# Patient Record
Sex: MALE | URBAN - METROPOLITAN AREA
[De-identification: names, ages, dates, MRNs, and addresses within clinical notes are randomized per-mention and may not be internally consistent; named-entity substitution may affect disease eponyms.]

---

## 2023-04-25 ENCOUNTER — WEB ENCOUNTER (OUTPATIENT)
Dept: URBAN - METROPOLITAN AREA CLINIC 6 | Facility: CLINIC | Age: 53
End: 2023-04-25

## 2023-04-25 ENCOUNTER — WEB ENCOUNTER (OUTPATIENT)
Dept: URBAN - METROPOLITAN AREA CLINIC 40 | Facility: CLINIC | Age: 53
End: 2023-04-25

## 2023-04-28 ENCOUNTER — CLAIMS CREATED FROM THE CLAIM WINDOW (OUTPATIENT)
Dept: URBAN - METROPOLITAN AREA CLINIC 40 | Facility: CLINIC | Age: 53
End: 2023-04-28
Payer: COMMERCIAL

## 2023-04-28 ENCOUNTER — LAB OUTSIDE AN ENCOUNTER (OUTPATIENT)
Dept: URBAN - METROPOLITAN AREA CLINIC 40 | Facility: CLINIC | Age: 53
End: 2023-04-28

## 2023-04-28 VITALS
SYSTOLIC BLOOD PRESSURE: 130 MMHG | HEIGHT: 70 IN | BODY MASS INDEX: 39.28 KG/M2 | HEART RATE: 80 BPM | DIASTOLIC BLOOD PRESSURE: 80 MMHG | TEMPERATURE: 96.8 F | WEIGHT: 274.4 LBS

## 2023-04-28 DIAGNOSIS — R10.84 GENERALIZED ABDOMINAL CRAMPING: ICD-10-CM

## 2023-04-28 DIAGNOSIS — R14.0 BLOATING: ICD-10-CM

## 2023-04-28 DIAGNOSIS — K58.0 IRRITABLE BOWEL SYNDROME WITH DIARRHEA: ICD-10-CM

## 2023-04-28 DIAGNOSIS — R14.0 ABDOMINAL DISTENSION: ICD-10-CM

## 2023-04-28 PROBLEM — 197125005: Status: ACTIVE | Noted: 2023-04-28

## 2023-04-28 PROBLEM — 60728008: Status: ACTIVE | Noted: 2023-04-28

## 2023-04-28 PROCEDURE — 99204 OFFICE O/P NEW MOD 45 MIN: CPT | Performed by: INTERNAL MEDICINE

## 2023-04-28 RX ORDER — TRAMADOL HYDROCHLORIDE 50 MG/1
TABLET, COATED ORAL
Qty: 21 TABLET | Status: ACTIVE | COMMUNITY

## 2023-04-28 RX ORDER — FOLIC ACID 1 MG/1
TABLET ORAL
Qty: 90 TABLET | Status: ACTIVE | COMMUNITY

## 2023-04-28 RX ORDER — HYDRALAZINE HYDROCHLORIDE 50 MG/1
TABLET, FILM COATED ORAL
Qty: 270 TABLET | Status: ACTIVE | COMMUNITY

## 2023-04-28 RX ORDER — METHOTREXATE 2.5 MG/1
TABLET ORAL
Qty: 78 TABLET | Status: ACTIVE | COMMUNITY

## 2023-04-28 RX ORDER — FLUTICASONE FUROATE AND VILANTEROL TRIFENATATE 200; 25 UG/1; UG/1
POWDER RESPIRATORY (INHALATION)
Qty: 180 EACH | Status: ACTIVE | COMMUNITY

## 2023-04-28 RX ORDER — INSULIN LISPRO 100 [IU]/ML
INJECTION, SOLUTION INTRAVENOUS; SUBCUTANEOUS
Qty: 90 MILLILITER | Status: ACTIVE | COMMUNITY

## 2023-04-28 RX ORDER — ALBUTEROL SULFATE 108 UG/1
INHALANT RESPIRATORY (INHALATION)
Qty: 6.7 GRAM | Status: ACTIVE | COMMUNITY

## 2023-04-28 RX ORDER — PREDNISONE 5 MG/1
TABLET ORAL
Qty: 90 TABLET | Status: ACTIVE | COMMUNITY

## 2023-04-28 RX ORDER — CLONIDINE HYDROCHLORIDE 0.2 MG/1
TABLET ORAL
Qty: 180 TABLET | Status: ACTIVE | COMMUNITY

## 2023-04-28 RX ORDER — ATORVASTATIN CALCIUM 80 MG/1
TABLET, FILM COATED ORAL
Qty: 90 TABLET | Status: ACTIVE | COMMUNITY

## 2023-04-28 RX ORDER — FUROSEMIDE 20 MG/1
TABLET ORAL
Qty: 30 EACH | Refills: 0 | Status: ACTIVE | COMMUNITY

## 2023-04-28 RX ORDER — CARVEDILOL 25 MG/1
TABLET, FILM COATED ORAL
Qty: 180 TABLET | Status: ACTIVE | COMMUNITY

## 2023-04-28 RX ORDER — BRIMONIDINE TARTRATE AND TIMOLOL MALEATE 2; 5 MG/ML; MG/ML
INSTILL 1 DROP TWICE DAILY IN THE LEFT EYE SOLUTION/ DROPS OPHTHALMIC
Qty: 5 MILLILITER | Refills: 1 | Status: ACTIVE | COMMUNITY

## 2023-04-28 RX ORDER — RIFAXIMIN 550 MG/1
1 TABLET TABLET ORAL THREE TIMES A DAY
Qty: 42 TABLET | Refills: 0 | OUTPATIENT
Start: 2023-04-28 | End: 2023-05-12

## 2023-04-28 NOTE — HPI-TODAY'S VISIT:
53-year-old  male here to establish with GI.  He has been noticing progressive abdominal distention for the past year.  He has history of fatty liver disease and is obese.  Had vasculitis and is currently on long-term prednisone for the past 3 to 4 years.  He is on Rituxan and methotrexate as well.  Cardiac and lung work-up have been negative.  No recent abdominal imaging.  Has longstanding history of fatty liver disease.  Recent labs were normal.  He feels short of breath because of distention of his abdomen.  Intermittent loose stools, but overall has normal bowel bowel movements.

## 2023-04-28 NOTE — PHYSICAL EXAM GASTROINTESTINAL
Abdomen , soft, obese, distended , no guarding or rigidity , no masses palpable , normal bowel sounds , Liver and Spleen , no hepatomegaly present , no hepatosplenomegaly , liver nontender , spleen not palpable

## 2023-04-29 ENCOUNTER — WEB ENCOUNTER (OUTPATIENT)
Dept: URBAN - METROPOLITAN AREA CLINIC 40 | Facility: CLINIC | Age: 53
End: 2023-04-29

## 2023-04-29 RX ORDER — METRONIDAZOLE 500 MG/1
1 TABLET TABLET ORAL THREE TIMES A DAY
Qty: 42 TABLET | Refills: 0 | OUTPATIENT
Start: 2023-05-02 | End: 2023-05-16

## 2023-06-28 ENCOUNTER — WEB ENCOUNTER (OUTPATIENT)
Dept: URBAN - METROPOLITAN AREA CLINIC 40 | Facility: CLINIC | Age: 53
End: 2023-06-28

## 2023-06-30 ENCOUNTER — OFFICE VISIT (OUTPATIENT)
Dept: URBAN - METROPOLITAN AREA CLINIC 40 | Facility: CLINIC | Age: 53
End: 2023-06-30
Payer: COMMERCIAL

## 2023-06-30 VITALS
WEIGHT: 262.8 LBS | TEMPERATURE: 97 F | BODY MASS INDEX: 37.62 KG/M2 | DIASTOLIC BLOOD PRESSURE: 70 MMHG | HEART RATE: 87 BPM | SYSTOLIC BLOOD PRESSURE: 130 MMHG | HEIGHT: 70 IN

## 2023-06-30 DIAGNOSIS — R10.84 GENERALIZED ABDOMINAL CRAMPING: ICD-10-CM

## 2023-06-30 DIAGNOSIS — K58.0 IRRITABLE BOWEL SYNDROME WITH DIARRHEA: ICD-10-CM

## 2023-06-30 DIAGNOSIS — R14.0 BLOATING: ICD-10-CM

## 2023-06-30 PROCEDURE — 99214 OFFICE O/P EST MOD 30 MIN: CPT | Performed by: INTERNAL MEDICINE

## 2023-06-30 RX ORDER — CARVEDILOL 25 MG/1
TABLET, FILM COATED ORAL
Qty: 180 TABLET | Status: ACTIVE | COMMUNITY

## 2023-06-30 RX ORDER — INSULIN LISPRO 100 [IU]/ML
INJECTION, SOLUTION INTRAVENOUS; SUBCUTANEOUS
Qty: 90 MILLILITER | Status: ACTIVE | COMMUNITY

## 2023-06-30 RX ORDER — BRIMONIDINE TARTRATE AND TIMOLOL MALEATE 2; 5 MG/ML; MG/ML
INSTILL 1 DROP TWICE DAILY IN THE LEFT EYE SOLUTION/ DROPS OPHTHALMIC
Qty: 5 MILLILITER | Refills: 1 | Status: ACTIVE | COMMUNITY

## 2023-06-30 RX ORDER — METHOTREXATE 2.5 MG/1
TABLET ORAL
Qty: 78 TABLET | Status: ACTIVE | COMMUNITY

## 2023-06-30 RX ORDER — FUROSEMIDE 20 MG/1
TABLET ORAL
Qty: 30 EACH | Refills: 0 | Status: ACTIVE | COMMUNITY

## 2023-06-30 RX ORDER — HYDRALAZINE HYDROCHLORIDE 50 MG/1
TABLET, FILM COATED ORAL
Qty: 270 TABLET | Status: ACTIVE | COMMUNITY

## 2023-06-30 RX ORDER — FLUTICASONE FUROATE AND VILANTEROL TRIFENATATE 200; 25 UG/1; UG/1
POWDER RESPIRATORY (INHALATION)
Qty: 180 EACH | Status: ACTIVE | COMMUNITY

## 2023-06-30 RX ORDER — ALBUTEROL SULFATE 108 UG/1
INHALANT RESPIRATORY (INHALATION)
Qty: 6.7 GRAM | Status: ACTIVE | COMMUNITY

## 2023-06-30 RX ORDER — TRAMADOL HYDROCHLORIDE 50 MG/1
TABLET, COATED ORAL
Qty: 21 TABLET | Status: ACTIVE | COMMUNITY

## 2023-06-30 RX ORDER — PREDNISONE 5 MG/1
TABLET ORAL
Qty: 90 TABLET | Status: ACTIVE | COMMUNITY

## 2023-06-30 RX ORDER — FOLIC ACID 1 MG/1
TABLET ORAL
Qty: 90 TABLET | Status: ACTIVE | COMMUNITY

## 2023-06-30 RX ORDER — ATORVASTATIN CALCIUM 80 MG/1
TABLET, FILM COATED ORAL
Qty: 90 TABLET | Status: ACTIVE | COMMUNITY

## 2023-06-30 RX ORDER — CLONIDINE HYDROCHLORIDE 0.2 MG/1
TABLET ORAL
Qty: 180 TABLET | Status: ACTIVE | COMMUNITY

## 2023-06-30 NOTE — HPI-TODAY'S VISIT:
53-year-old  male here for f/u.  abdominal distension is better after flagyl/low FODMAP diet. He has history of fatty liver disease and is obese.  Had vasculitis and is currently on long-term prednisone for the past 3 to 4 years.  He is on Rituxan and methotrexate as well.  Cardiac and lung work-up have been negative.  recent CT-fatty replacement of pancreas/periumbilical hernia.  Has longstanding history of fatty liver disease.  Recent labs were normal.   Intermittent loose stools, but overall has normal bowel bowel movements.had EGD/colonoscopy in 2015. no polyps at that time

## 2023-06-30 NOTE — PHYSICAL EXAM GASTROINTESTINAL
Abdomen , soft, obese, less bloated than before, reducible umbilical hernia, no guarding or rigidity , no masses palpable , normal bowel sounds , Liver and Spleen , no hepatomegaly present , no hepatosplenomegaly , liver nontender , spleen not palpable

## 2023-10-23 ENCOUNTER — WEB ENCOUNTER (OUTPATIENT)
Dept: URBAN - METROPOLITAN AREA CLINIC 40 | Facility: CLINIC | Age: 53
End: 2023-10-23

## 2023-10-24 ENCOUNTER — OFFICE VISIT (OUTPATIENT)
Dept: URBAN - METROPOLITAN AREA CLINIC 40 | Facility: CLINIC | Age: 53
End: 2023-10-24
Payer: COMMERCIAL

## 2023-10-24 VITALS
TEMPERATURE: 97.9 F | BODY MASS INDEX: 37.51 KG/M2 | SYSTOLIC BLOOD PRESSURE: 146 MMHG | HEART RATE: 65 BPM | DIASTOLIC BLOOD PRESSURE: 68 MMHG | WEIGHT: 262 LBS | HEIGHT: 70 IN

## 2023-10-24 DIAGNOSIS — R14.0 ABDOMINAL DISTENSION: ICD-10-CM

## 2023-10-24 DIAGNOSIS — K52.9 CHRONIC DIARRHEA: ICD-10-CM

## 2023-10-24 DIAGNOSIS — R14.0 BLOATING: ICD-10-CM

## 2023-10-24 DIAGNOSIS — R10.84 GENERALIZED ABDOMINAL CRAMPING: ICD-10-CM

## 2023-10-24 DIAGNOSIS — K58.0 IRRITABLE BOWEL SYNDROME WITH DIARRHEA: ICD-10-CM

## 2023-10-24 PROCEDURE — 99214 OFFICE O/P EST MOD 30 MIN: CPT | Performed by: PHYSICIAN ASSISTANT

## 2023-10-24 RX ORDER — TERAZOSIN 2 MG/1
1 CAPSULE AT BEDTIME CAPSULE ORAL ONCE A DAY
Status: ACTIVE | COMMUNITY

## 2023-10-24 RX ORDER — HYDRALAZINE HYDROCHLORIDE 50 MG/1
TABLET, FILM COATED ORAL
Qty: 270 TABLET | Status: ACTIVE | COMMUNITY

## 2023-10-24 RX ORDER — FOLIC ACID 1 MG/1
TABLET ORAL
Qty: 90 TABLET | Status: ACTIVE | COMMUNITY

## 2023-10-24 RX ORDER — CLONIDINE HYDROCHLORIDE 0.2 MG/1
TABLET ORAL
Qty: 180 TABLET | Status: ACTIVE | COMMUNITY

## 2023-10-24 RX ORDER — CYCLOBENZAPRINE HYDROCHLORIDE 10 MG/1
1 TABLET AT BEDTIME AS NEEDED TABLET, FILM COATED ORAL ONCE A DAY
Status: ACTIVE | COMMUNITY

## 2023-10-24 RX ORDER — FUROSEMIDE 20 MG/1
TABLET ORAL
Qty: 30 EACH | Refills: 0 | Status: ACTIVE | COMMUNITY

## 2023-10-24 RX ORDER — TRAMADOL HYDROCHLORIDE 50 MG/1
TABLET, COATED ORAL
Qty: 21 TABLET | Status: ACTIVE | COMMUNITY

## 2023-10-24 RX ORDER — INSULIN LISPRO 100 [IU]/ML
INJECTION, SOLUTION INTRAVENOUS; SUBCUTANEOUS
Qty: 90 MILLILITER | Status: ACTIVE | COMMUNITY

## 2023-10-24 RX ORDER — PREDNISONE 5 MG/1
TABLET ORAL
Qty: 90 TABLET | Status: ACTIVE | COMMUNITY

## 2023-10-24 RX ORDER — METHOTREXATE 2.5 MG/1
TABLET ORAL
Qty: 78 TABLET | Status: ACTIVE | COMMUNITY

## 2023-10-24 RX ORDER — FLUTICASONE FUROATE AND VILANTEROL TRIFENATATE 200; 25 UG/1; UG/1
POWDER RESPIRATORY (INHALATION)
Qty: 180 EACH | Status: ACTIVE | COMMUNITY

## 2023-10-24 RX ORDER — BRIMONIDINE TARTRATE AND TIMOLOL MALEATE 2; 5 MG/ML; MG/ML
INSTILL 1 DROP TWICE DAILY IN THE LEFT EYE SOLUTION/ DROPS OPHTHALMIC
Qty: 5 MILLILITER | Refills: 1 | Status: ACTIVE | COMMUNITY

## 2023-10-24 RX ORDER — CARVEDILOL 25 MG/1
TABLET, FILM COATED ORAL
Qty: 180 TABLET | Status: ACTIVE | COMMUNITY

## 2023-10-24 RX ORDER — ATORVASTATIN CALCIUM 80 MG/1
TABLET, FILM COATED ORAL
Qty: 90 TABLET | Status: ACTIVE | COMMUNITY

## 2023-10-24 RX ORDER — ALBUTEROL SULFATE 108 UG/1
INHALANT RESPIRATORY (INHALATION)
Qty: 6.7 GRAM | Status: ACTIVE | COMMUNITY

## 2023-10-24 NOTE — HPI-TODAY'S VISIT:
53-year-old  male here for f/u.  abdominal distension is better after flagyl/low FODMAP diet. He has history of fatty liver disease and is obese.  Had vasculitis and is currently on long-term prednisone for the past 3 to 4 years.  He is on Rituxan and methotrexate as well.  Cardiac and lung work-up have been negative.  recent CT-fatty replacement of pancreas/periumbilical hernia.  Has longstanding history of fatty liver disease.  Recent labs were normal.   Intermittent loose stools, but overall has normal bowel bowel movements.had EGD/colonoscopy in 2015. no polyps at that time He returns to the office today once again reporting bloating.  Admits that he is drinking beer several days a week though he is cut back overall.  No nausea, vomiting or dysphagia.  He does believe a trial of Flagyl may have helped him previously.  The diarrhea has improved as he is only really having loose bowel movement once daily without any bleeding.  He did not complete fecal fat or elastase testing from his last visit.  He is maintaining a low-carb diet, eating healthy fats and protein mainly.

## 2023-10-26 LAB
IMMUNOGLOBULIN A: 145
INTERPRETATION: (no result)
TISSUE TRANSGLUTAMINASE AB, IGA: <1

## 2023-11-14 ENCOUNTER — LAB OUTSIDE AN ENCOUNTER (OUTPATIENT)
Dept: URBAN - METROPOLITAN AREA CLINIC 40 | Facility: CLINIC | Age: 53
End: 2023-11-14

## 2023-11-18 LAB
ADENOVIRUS F 40/41: NOT DETECTED
CAMPYLOBACTER: NOT DETECTED
CLOSTRIDIUM DIFFICILE: NOT DETECTED
ENTAMOEBA HISTOLYTICA: NOT DETECTED
ENTEROAGGREGATIVE E.COLI: NOT DETECTED
ENTEROTOXIGENIC E.COLI: NOT DETECTED
ESCHERICHIA COLI O157: NOT DETECTED
FECAL FAT, QUALITATIVE: (no result)
GIARDIA LAMBLIA: NOT DETECTED
NOROVIRUS GI/GII: NOT DETECTED
PANCREATICELASTASE ELISA, STOOL: (no result)
ROTAVIRUS A: NOT DETECTED
SALMONELLA SPP.: NOT DETECTED
SHIGA-LIKE TOXIN PRODUCING E.COLI: NOT DETECTED
SHIGELLA SPP. / ENTEROINVASIVE E.COLI: NOT DETECTED
VIBRIO PARAHAEMOLYTICUS: NOT DETECTED
VIBRIO SPP.: NOT DETECTED
YERSINIA ENTEROCOLITICA: NOT DETECTED

## 2023-11-21 LAB — RESULT:: (no result)

## 2023-12-05 ENCOUNTER — DASHBOARD ENCOUNTERS (OUTPATIENT)
Age: 53
End: 2023-12-05

## 2023-12-05 ENCOUNTER — OFFICE VISIT (OUTPATIENT)
Dept: URBAN - METROPOLITAN AREA CLINIC 40 | Facility: CLINIC | Age: 53
End: 2023-12-05
Payer: COMMERCIAL

## 2023-12-05 VITALS
SYSTOLIC BLOOD PRESSURE: 134 MMHG | WEIGHT: 266 LBS | HEART RATE: 70 BPM | BODY MASS INDEX: 38.08 KG/M2 | DIASTOLIC BLOOD PRESSURE: 76 MMHG | HEIGHT: 70 IN | TEMPERATURE: 97.9 F

## 2023-12-05 DIAGNOSIS — R10.84 GENERALIZED ABDOMINAL CRAMPING: ICD-10-CM

## 2023-12-05 DIAGNOSIS — K58.0 IRRITABLE BOWEL SYNDROME WITH DIARRHEA: ICD-10-CM

## 2023-12-05 DIAGNOSIS — R14.0 BLOATING: ICD-10-CM

## 2023-12-05 PROCEDURE — 99213 OFFICE O/P EST LOW 20 MIN: CPT | Performed by: PHYSICIAN ASSISTANT

## 2023-12-05 RX ORDER — FUROSEMIDE 20 MG/1
TABLET ORAL
Qty: 30 EACH | Refills: 0 | Status: ACTIVE | COMMUNITY

## 2023-12-05 RX ORDER — CYCLOBENZAPRINE HYDROCHLORIDE 10 MG/1
1 TABLET AT BEDTIME AS NEEDED TABLET, FILM COATED ORAL ONCE A DAY
Status: ACTIVE | COMMUNITY

## 2023-12-05 RX ORDER — ATORVASTATIN CALCIUM 80 MG/1
TABLET, FILM COATED ORAL
Qty: 90 TABLET | Status: ACTIVE | COMMUNITY

## 2023-12-05 RX ORDER — CARVEDILOL 25 MG/1
TABLET, FILM COATED ORAL
Qty: 180 TABLET | Status: ACTIVE | COMMUNITY

## 2023-12-05 RX ORDER — BRIMONIDINE TARTRATE AND TIMOLOL MALEATE 2; 5 MG/ML; MG/ML
INSTILL 1 DROP TWICE DAILY IN THE LEFT EYE SOLUTION/ DROPS OPHTHALMIC
Qty: 5 MILLILITER | Refills: 1 | Status: ACTIVE | COMMUNITY

## 2023-12-05 RX ORDER — TERAZOSIN 2 MG/1
1 CAPSULE AT BEDTIME CAPSULE ORAL ONCE A DAY
Status: ACTIVE | COMMUNITY

## 2023-12-05 RX ORDER — CLONIDINE HYDROCHLORIDE 0.2 MG/1
TABLET ORAL
Qty: 180 TABLET | Status: ACTIVE | COMMUNITY

## 2023-12-05 RX ORDER — PREDNISONE 5 MG/1
TABLET ORAL
Qty: 90 TABLET | Status: ACTIVE | COMMUNITY

## 2023-12-05 RX ORDER — FLUTICASONE FUROATE AND VILANTEROL TRIFENATATE 200; 25 UG/1; UG/1
POWDER RESPIRATORY (INHALATION)
Qty: 180 EACH | Status: ACTIVE | COMMUNITY

## 2023-12-05 RX ORDER — FOLIC ACID 1 MG/1
TABLET ORAL
Qty: 90 TABLET | Status: ACTIVE | COMMUNITY

## 2023-12-05 RX ORDER — TRAMADOL HYDROCHLORIDE 50 MG/1
TABLET, COATED ORAL
Qty: 21 TABLET | Status: ACTIVE | COMMUNITY

## 2023-12-05 RX ORDER — INSULIN LISPRO 100 [IU]/ML
INJECTION, SOLUTION INTRAVENOUS; SUBCUTANEOUS
Qty: 90 MILLILITER | Status: ACTIVE | COMMUNITY

## 2023-12-05 RX ORDER — ALBUTEROL SULFATE 108 UG/1
INHALANT RESPIRATORY (INHALATION)
Qty: 6.7 GRAM | Status: ACTIVE | COMMUNITY

## 2023-12-05 RX ORDER — METHOTREXATE 2.5 MG/1
TABLET ORAL
Qty: 78 TABLET | Status: ACTIVE | COMMUNITY

## 2023-12-05 RX ORDER — HYDRALAZINE HYDROCHLORIDE 50 MG/1
TABLET, FILM COATED ORAL
Qty: 270 TABLET | Status: ACTIVE | COMMUNITY

## 2023-12-05 NOTE — HPI-TODAY'S VISIT:
53-year-old  male here for f/u.  abdominal distension is better after flagyl/low FODMAP diet. He has history of fatty liver disease and is obese.  Had vasculitis and is currently on long-term prednisone for the past 3 to 4 years.  He is on Rituxan and methotrexate as well.  Cardiac and lung work-up have been negative.  recent CT-fatty replacement of pancreas/periumbilical hernia.  Has longstanding history of fatty liver disease.  Recent labs were normal.   Intermittent loose stools, but overall has normal bowel bowel movements.had EGD/colonoscopy in 2015. no polyps at that time Recently seen once again reporting bloating.  Admits that he is drinking beer several days a week though he is cut back overall.  No nausea, vomiting or dysphagia.  He does believe a trial of Flagyl may have helped him previously.  The diarrhea has improved as he is only really having loose bowel movement once daily without any bleeding.  He did not complete fecal fat or elastase testing from his last visit.  He is maintaining a low-carb diet, eating healthy fats and protein mainly. Today, he feels okay today but states that he does have some early satiety after eating significant amounts of starches and other low fiber foods during the Thanksgiving holiday.  No new complaints.  He is not taking any prescribed medications from our office and does not want to add medications due to history of polypharmacy.  Recent labs including celiac serology, GI multiplex, H. pylori stool antigen, fecal fat and fecal elastase all normal or normal range.

## 2024-06-05 ENCOUNTER — OFFICE VISIT (OUTPATIENT)
Dept: URBAN - METROPOLITAN AREA CLINIC 40 | Facility: CLINIC | Age: 54
End: 2024-06-05
Payer: COMMERCIAL

## 2024-06-05 VITALS
WEIGHT: 257 LBS | BODY MASS INDEX: 36.79 KG/M2 | HEART RATE: 81 BPM | DIASTOLIC BLOOD PRESSURE: 64 MMHG | HEIGHT: 70 IN | TEMPERATURE: 98.1 F | SYSTOLIC BLOOD PRESSURE: 140 MMHG

## 2024-06-05 DIAGNOSIS — R14.0 BLOATING: ICD-10-CM

## 2024-06-05 DIAGNOSIS — Z86.39 HISTORY OF DIABETES MELLITUS: ICD-10-CM

## 2024-06-05 DIAGNOSIS — K76.0 FATTY LIVER: ICD-10-CM

## 2024-06-05 DIAGNOSIS — K58.0 IRRITABLE BOWEL SYNDROME WITH DIARRHEA: ICD-10-CM

## 2024-06-05 PROCEDURE — 99213 OFFICE O/P EST LOW 20 MIN: CPT | Performed by: PHYSICIAN ASSISTANT

## 2024-06-05 RX ORDER — ATORVASTATIN CALCIUM 80 MG/1
TABLET, FILM COATED ORAL
Qty: 90 TABLET | Status: ACTIVE | COMMUNITY

## 2024-06-05 RX ORDER — ALBUTEROL SULFATE 108 UG/1
INHALANT RESPIRATORY (INHALATION)
Qty: 6.7 GRAM | Status: ACTIVE | COMMUNITY

## 2024-06-05 RX ORDER — FLUTICASONE FUROATE AND VILANTEROL TRIFENATATE 200; 25 UG/1; UG/1
POWDER RESPIRATORY (INHALATION)
Qty: 180 EACH | Status: ACTIVE | COMMUNITY

## 2024-06-05 RX ORDER — METHOTREXATE 2.5 MG/1
TABLET ORAL
Qty: 78 TABLET | Status: ACTIVE | COMMUNITY

## 2024-06-05 RX ORDER — FOLIC ACID 1 MG/1
TABLET ORAL
Qty: 90 TABLET | Status: ACTIVE | COMMUNITY

## 2024-06-05 RX ORDER — TRAMADOL HYDROCHLORIDE 50 MG/1
TABLET, COATED ORAL
Qty: 21 TABLET | Status: ACTIVE | COMMUNITY

## 2024-06-05 RX ORDER — CYCLOBENZAPRINE HYDROCHLORIDE 10 MG/1
1 TABLET AT BEDTIME AS NEEDED TABLET, FILM COATED ORAL ONCE A DAY
Status: ACTIVE | COMMUNITY

## 2024-06-05 RX ORDER — CARVEDILOL 25 MG/1
TABLET, FILM COATED ORAL
Qty: 180 TABLET | Status: ACTIVE | COMMUNITY

## 2024-06-05 RX ORDER — TERAZOSIN 2 MG/1
1 CAPSULE AT BEDTIME CAPSULE ORAL ONCE A DAY
Status: ACTIVE | COMMUNITY

## 2024-06-05 RX ORDER — PREDNISONE 5 MG/1
TABLET ORAL
Qty: 90 TABLET | Status: ACTIVE | COMMUNITY

## 2024-06-05 RX ORDER — BRIMONIDINE TARTRATE AND TIMOLOL MALEATE 2; 5 MG/ML; MG/ML
INSTILL 1 DROP TWICE DAILY IN THE LEFT EYE SOLUTION/ DROPS OPHTHALMIC
Qty: 5 MILLILITER | Refills: 1 | Status: ACTIVE | COMMUNITY

## 2024-06-05 RX ORDER — HYDRALAZINE HYDROCHLORIDE 50 MG/1
TABLET, FILM COATED ORAL
Qty: 270 TABLET | Status: ACTIVE | COMMUNITY

## 2024-06-05 RX ORDER — INSULIN LISPRO 100 [IU]/ML
INJECTION, SOLUTION INTRAVENOUS; SUBCUTANEOUS
Qty: 90 MILLILITER | Status: ACTIVE | COMMUNITY

## 2024-06-05 RX ORDER — CLONIDINE HYDROCHLORIDE 0.2 MG/1
TABLET ORAL
Qty: 180 TABLET | Status: DISCONTINUED | COMMUNITY

## 2024-06-05 RX ORDER — FUROSEMIDE 20 MG/1
TABLET ORAL
Qty: 30 EACH | Refills: 0 | Status: ACTIVE | COMMUNITY

## 2024-06-05 NOTE — HPI-TODAY'S VISIT:
Mr. Maldonado is a 54 year-old  male here for f/u, last seen 12/5/24. Prior complaint of abdominal distension improved after flagyl/low FODMAP diet. He has history of fatty liver disease and is obese.  Had vasculitis and is currently on long-term prednisone for the past 3 to 4 years.  He is on Rituxan and methotrexate as well.  Cardiac and lung work-up have been negative.  recent CT-fatty replacement of pancreas/periumbilical hernia.  Has longstanding history of fatty liver disease.  He had EGD/colonoscopy in 2015. no polyps at that time. Admits to drinking beer several days a week though he is cut back overall.  No nausea, vomiting or dysphagia. He is maintaining a low-carb diet, eating healthy fats and protein mainly. He is not taking any prescribed medications from our office and does not want to add medications due to history of polypharmacy.  Recent labs including celiac serology, GI multiplex, H. pylori stool antigen, fecal fat and fecal elastase all normal or normal range. Down 10 pounds since last visit. No GI complaints.

## 2025-03-05 ENCOUNTER — LAB OUTSIDE AN ENCOUNTER (OUTPATIENT)
Dept: URBAN - METROPOLITAN AREA CLINIC 40 | Facility: CLINIC | Age: 55
End: 2025-03-05

## 2025-03-05 ENCOUNTER — OFFICE VISIT (OUTPATIENT)
Dept: URBAN - METROPOLITAN AREA CLINIC 40 | Facility: CLINIC | Age: 55
End: 2025-03-05
Payer: COMMERCIAL

## 2025-03-05 VITALS
SYSTOLIC BLOOD PRESSURE: 154 MMHG | BODY MASS INDEX: 38.65 KG/M2 | DIASTOLIC BLOOD PRESSURE: 76 MMHG | WEIGHT: 270 LBS | HEIGHT: 70 IN | HEART RATE: 76 BPM | TEMPERATURE: 99 F

## 2025-03-05 DIAGNOSIS — K76.0 FATTY LIVER: ICD-10-CM

## 2025-03-05 DIAGNOSIS — R14.0 BLOATING: ICD-10-CM

## 2025-03-05 DIAGNOSIS — K58.0 IRRITABLE BOWEL SYNDROME WITH DIARRHEA: ICD-10-CM

## 2025-03-05 DIAGNOSIS — Z12.11 ENCOUNTER FOR SCREENING COLONOSCOPY: ICD-10-CM

## 2025-03-05 PROCEDURE — 99213 OFFICE O/P EST LOW 20 MIN: CPT | Performed by: PHYSICIAN ASSISTANT

## 2025-03-05 RX ORDER — FUROSEMIDE 20 MG/1
TABLET ORAL
Qty: 30 EACH | Refills: 0 | Status: ACTIVE | COMMUNITY

## 2025-03-05 RX ORDER — CYCLOBENZAPRINE HYDROCHLORIDE 10 MG/1
1 TABLET AT BEDTIME AS NEEDED TABLET, FILM COATED ORAL ONCE A DAY
Status: ACTIVE | COMMUNITY

## 2025-03-05 RX ORDER — PREDNISONE 5 MG/1
TABLET ORAL
Qty: 90 TABLET | Status: ACTIVE | COMMUNITY

## 2025-03-05 RX ORDER — CARVEDILOL 25 MG/1
TABLET, FILM COATED ORAL
Qty: 180 TABLET | Status: ACTIVE | COMMUNITY

## 2025-03-05 RX ORDER — TRAMADOL HYDROCHLORIDE 50 MG/1
TABLET, COATED ORAL
Qty: 21 TABLET | Status: ACTIVE | COMMUNITY

## 2025-03-05 RX ORDER — INSULIN LISPRO 100 [IU]/ML
INJECTION, SOLUTION INTRAVENOUS; SUBCUTANEOUS
Qty: 90 MILLILITER | Status: ACTIVE | COMMUNITY

## 2025-03-05 RX ORDER — METHOTREXATE 2.5 MG/1
TABLET ORAL
Qty: 78 TABLET | Status: ACTIVE | COMMUNITY

## 2025-03-05 RX ORDER — ATORVASTATIN CALCIUM 80 MG/1
TABLET, FILM COATED ORAL
Qty: 90 TABLET | Status: ACTIVE | COMMUNITY

## 2025-03-05 RX ORDER — HYDRALAZINE HYDROCHLORIDE 50 MG/1
TABLET, FILM COATED ORAL
Qty: 270 TABLET | Status: ACTIVE | COMMUNITY

## 2025-03-05 RX ORDER — ALBUTEROL SULFATE 108 UG/1
INHALANT RESPIRATORY (INHALATION)
Qty: 6.7 GRAM | Status: ACTIVE | COMMUNITY

## 2025-03-05 RX ORDER — FLUTICASONE FUROATE AND VILANTEROL TRIFENATATE 200; 25 UG/1; UG/1
POWDER RESPIRATORY (INHALATION)
Qty: 180 EACH | Status: ACTIVE | COMMUNITY

## 2025-03-05 RX ORDER — FOLIC ACID 1 MG/1
TABLET ORAL
Qty: 90 TABLET | Status: ACTIVE | COMMUNITY

## 2025-03-05 RX ORDER — TERAZOSIN 5 MG/1
1 CAPSULE AT BEDTIME CAPSULE ORAL ONCE A DAY
Status: ACTIVE | COMMUNITY

## 2025-03-05 RX ORDER — BRIMONIDINE TARTRATE AND TIMOLOL MALEATE 2; 5 MG/ML; MG/ML
INSTILL 1 DROP TWICE DAILY IN THE LEFT EYE SOLUTION/ DROPS OPHTHALMIC
Qty: 5 MILLILITER | Refills: 1 | Status: ACTIVE | COMMUNITY

## 2025-03-05 NOTE — HPI-TODAY'S VISIT:
Mr. Maldonado is a 54 year-old  male here for f/u, last seen 12/5/24. Prior complaint of abdominal distension improved after flagyl/low FODMAP diet. He has history of fatty liver disease and is obesity.  Had vasculitis and is currently on long-term prednisone for the past 3 to 4 years.  He is on Rituxan and methotrexate as well.  Cardiac and lung work-up have been negative.  A recent CT-fatty replacement of pancreas/periumbilical hernia.  Has longstanding history of fatty liver disease.  He had EGD/colonoscopy in 2015-no polyps at that time. Admits to drinking beer several days a week though he is cut back overall. He is maintaining a low-carb diet, eating healthy fats and protein mainly. He has gained >10 pounds. He is not taking any prescribed medications from our office and does not want to add medications due to history of polypharmacy.   Recent labs including celiac serology, GI multiplex, H. pylori stool antigen, fecal fat and fecal elastase all normal or normal range. Patient has no complaints today.  He admits that his bloating is controlled though he has been eating more junk food recently.  No other complaints, ready to schedule screening colonoscopy.

## 2025-03-12 ENCOUNTER — CLAIMS CREATED FROM THE CLAIM WINDOW (OUTPATIENT)
Dept: URBAN - METROPOLITAN AREA CLINIC 4 | Facility: CLINIC | Age: 55
End: 2025-03-12
Payer: COMMERCIAL

## 2025-03-12 ENCOUNTER — OFFICE VISIT (OUTPATIENT)
Dept: URBAN - METROPOLITAN AREA SURGERY CENTER 30 | Facility: SURGERY CENTER | Age: 55
End: 2025-03-12
Payer: COMMERCIAL

## 2025-03-12 DIAGNOSIS — Z12.11 COLON CANCER SCREENING: ICD-10-CM

## 2025-03-12 DIAGNOSIS — D12.3 BENIGN NEOPLASM OF TRANSVERSE COLON: ICD-10-CM

## 2025-03-12 DIAGNOSIS — K57.30 COLONIC DIVERTICULOSIS: ICD-10-CM

## 2025-03-12 DIAGNOSIS — D12.3 ADENOMA OF TRANSVERSE COLON: ICD-10-CM

## 2025-03-12 DIAGNOSIS — K64.8 OTHER HEMORRHOIDS: ICD-10-CM

## 2025-03-12 PROCEDURE — 00812 ANES LWR INTST SCR COLSC: CPT | Performed by: NURSE ANESTHETIST, CERTIFIED REGISTERED

## 2025-03-12 PROCEDURE — 88305 TISSUE EXAM BY PATHOLOGIST: CPT | Performed by: PATHOLOGY

## 2025-03-12 PROCEDURE — 45385 COLONOSCOPY W/LESION REMOVAL: CPT | Performed by: INTERNAL MEDICINE

## 2025-04-11 ENCOUNTER — OFFICE VISIT (OUTPATIENT)
Dept: URBAN - METROPOLITAN AREA TELEHEALTH 2 | Facility: TELEHEALTH | Age: 55
End: 2025-04-11
Payer: COMMERCIAL

## 2025-04-11 DIAGNOSIS — K58.0 IRRITABLE BOWEL SYNDROME WITH DIARRHEA: ICD-10-CM

## 2025-04-11 DIAGNOSIS — D12.6 ADENOMATOUS POLYP OF COLON: ICD-10-CM

## 2025-04-11 DIAGNOSIS — K57.90 DIVERTICULOSIS: ICD-10-CM

## 2025-04-11 DIAGNOSIS — K76.0 FATTY LIVER: ICD-10-CM

## 2025-04-11 DIAGNOSIS — R14.0 BLOATING: ICD-10-CM

## 2025-04-11 PROCEDURE — 99213 OFFICE O/P EST LOW 20 MIN: CPT | Performed by: PHYSICIAN ASSISTANT

## 2025-04-11 RX ORDER — CARVEDILOL 25 MG/1
TABLET, FILM COATED ORAL
Qty: 180 TABLET | Status: ACTIVE | COMMUNITY

## 2025-04-11 RX ORDER — ATORVASTATIN CALCIUM 80 MG/1
TABLET, FILM COATED ORAL
Qty: 90 TABLET | Status: ACTIVE | COMMUNITY

## 2025-04-11 RX ORDER — FLUTICASONE FUROATE AND VILANTEROL TRIFENATATE 200; 25 UG/1; UG/1
POWDER RESPIRATORY (INHALATION)
Qty: 180 EACH | Status: ACTIVE | COMMUNITY

## 2025-04-11 RX ORDER — METHOTREXATE 2.5 MG/1
TABLET ORAL
Qty: 78 TABLET | Status: ACTIVE | COMMUNITY

## 2025-04-11 RX ORDER — INSULIN LISPRO 100 [IU]/ML
INJECTION, SOLUTION INTRAVENOUS; SUBCUTANEOUS
Qty: 90 MILLILITER | Status: ACTIVE | COMMUNITY

## 2025-04-11 RX ORDER — FOLIC ACID 1 MG/1
TABLET ORAL
Qty: 90 TABLET | Status: ACTIVE | COMMUNITY

## 2025-04-11 RX ORDER — BRIMONIDINE TARTRATE AND TIMOLOL MALEATE 2; 5 MG/ML; MG/ML
INSTILL 1 DROP TWICE DAILY IN THE LEFT EYE SOLUTION/ DROPS OPHTHALMIC
Qty: 5 MILLILITER | Refills: 1 | Status: ACTIVE | COMMUNITY

## 2025-04-11 RX ORDER — CYCLOBENZAPRINE HYDROCHLORIDE 10 MG/1
1 TABLET AT BEDTIME AS NEEDED TABLET, FILM COATED ORAL ONCE A DAY
Status: ACTIVE | COMMUNITY

## 2025-04-11 RX ORDER — TERAZOSIN 5 MG/1
1 CAPSULE AT BEDTIME CAPSULE ORAL ONCE A DAY
Status: ACTIVE | COMMUNITY

## 2025-04-11 RX ORDER — TRAMADOL HYDROCHLORIDE 50 MG/1
TABLET, COATED ORAL
Qty: 21 TABLET | Status: ACTIVE | COMMUNITY

## 2025-04-11 RX ORDER — ALBUTEROL SULFATE 108 UG/1
INHALANT RESPIRATORY (INHALATION)
Qty: 6.7 GRAM | Status: ACTIVE | COMMUNITY

## 2025-04-11 RX ORDER — HYDRALAZINE HYDROCHLORIDE 50 MG/1
TABLET, FILM COATED ORAL
Qty: 270 TABLET | Status: ACTIVE | COMMUNITY

## 2025-04-11 RX ORDER — FUROSEMIDE 20 MG/1
TABLET ORAL
Qty: 30 EACH | Refills: 0 | Status: ACTIVE | COMMUNITY

## 2025-04-11 RX ORDER — PREDNISONE 5 MG/1
TABLET ORAL
Qty: 90 TABLET | Status: ACTIVE | COMMUNITY

## 2025-04-11 NOTE — HPI-TODAY'S VISIT:
Mr. Maldonado is a 55 year-old  male here for f/u, last seen 3/5/25. Prior complaint of abdominal distension improved after flagyl/low FODMAP diet. He has history of fatty liver disease and is obesity.  Had vasculitis and is currently on long-term prednisone for the past 3 to 4 years.  He is on Rituxan and methotrexate as well.  Cardiac and lung work-up have been negative.  A recent CT-fatty replacement of pancreas/periumbilical hernia.  Has longstanding history of fatty liver disease.  He had EGD/colonoscopy in 2015-no polyps at that time. Admits to drinking beer several days a week though he is cut back overall. He is maintaining a low-carb diet, eating healthy fats and protein mainly.   Recent labs including celiac serology, GI multiplex, H. pylori stool antigen, fecal fat and fecal elastase all normal or normal range.  Mr. Maldonado had a colonoscopy March 12, 2025 with a small tubular adenoma removed from the descending colon.  Findings of diverticula of the descending and sigmoid colon noted as well as nonbleeding internal hemorrhoids.  A 7-year surveillance recommended.  He is trying to adhere to a low FODMAP diet and slowly reintroduce certain foods and tolerating them well.  He is also considering probiotic and prebiotic therapy.  No GI complaints today and denies any difficulties or issues following recent colonoscopy. With history of fatty liver,I do see patient had recent lab work including normal hepatic function panel.